# Patient Record
Sex: MALE | Race: WHITE | Employment: STUDENT | URBAN - METROPOLITAN AREA
[De-identification: names, ages, dates, MRNs, and addresses within clinical notes are randomized per-mention and may not be internally consistent; named-entity substitution may affect disease eponyms.]

---

## 2019-01-26 ENCOUNTER — APPOINTMENT (OUTPATIENT)
Dept: GENERAL RADIOLOGY | Facility: CLINIC | Age: 19
End: 2019-01-26
Payer: COMMERCIAL

## 2019-01-26 ENCOUNTER — HOSPITAL ENCOUNTER (EMERGENCY)
Facility: CLINIC | Age: 19
Discharge: HOME | End: 2019-01-26
Attending: EMERGENCY MEDICINE | Admitting: EMERGENCY MEDICINE
Payer: COMMERCIAL

## 2019-01-26 VITALS
OXYGEN SATURATION: 98 % | TEMPERATURE: 98.1 F | WEIGHT: 180.2 LBS | HEIGHT: 71 IN | SYSTOLIC BLOOD PRESSURE: 119 MMHG | RESPIRATION RATE: 14 BRPM | HEART RATE: 65 BPM | DIASTOLIC BLOOD PRESSURE: 58 MMHG | BODY MASS INDEX: 25.23 KG/M2

## 2019-01-26 DIAGNOSIS — S93.402A SPRAIN OF LEFT ANKLE, UNSPECIFIED LIGAMENT, INITIAL ENCOUNTER: ICD-10-CM

## 2019-01-26 PROCEDURE — 73610 X-RAY EXAM OF ANKLE: CPT | Mod: LT

## 2019-01-26 PROCEDURE — 99284 EMERGENCY DEPT VISIT MOD MDM: CPT

## 2019-01-26 PROCEDURE — 99283 EMERGENCY DEPT VISIT LOW MDM: CPT | Mod: Z6 | Performed by: EMERGENCY MEDICINE

## 2019-01-26 RX ORDER — OMEGA-3 FATTY ACIDS/FISH OIL 300-1000MG
CAPSULE ORAL
COMMUNITY

## 2019-01-26 ASSESSMENT — ENCOUNTER SYMPTOMS
WEAKNESS: 0
WOUND: 1
NUMBNESS: 0
HEADACHES: 0

## 2019-01-26 ASSESSMENT — MIFFLIN-ST. JEOR: SCORE: 1844.51

## 2019-01-26 NOTE — ED AVS SNAPSHOT
[facility], [place], Emergency Department  [address] [place] [id number]  Phone:  [phone]                                    Ion Matta   MRN: [id number]    Department:  [facility], Emergency Department   Date of Visit:  1/26/2019           After Visit Summary Signature Page    I have received my discharge instructions, and my questions have been answered. I have discussed any challenges I see with this plan with the nurse or doctor.    ..........................................................................................................................................  Patient/Patient Representative Signature      ..........................................................................................................................................  Patient Representative Print Name and Relationship to Patient    ..................................................               ................................................  Date                                   Time    ..........................................................................................................................................  Reviewed by Signature/Title    ...................................................              ..............................................  Date                                               Time          22EPIC Rev 08/18

## 2019-01-26 NOTE — ED PROVIDER NOTES
[place] EMERGENCY DEPARTMENT ([facility])  January 26, 2019    History     Chief Complaint   Patient presents with     Ankle Pain     HPI  Ion Matta is a 19 year old male who is otherwise healthy presents the ED after left ankle injury.  Patient states that yesterday he rolled his left ankle and was unable to bear weight on it afterwards.  He states he has been using his friend's crutches since then.  He states most of the pain is along the lateral aspect of his ankle and also has a minor contusion along the medial aspect.  He reports he took some Tylenol at noon today.  He otherwise currently denies any numbness or tingling, weakness, loss of consciousness, or head injury.    PAST MEDICAL HISTORY  History reviewed. No pertinent past medical history.  PAST SURGICAL HISTORY  History reviewed. No pertinent surgical history.  FAMILY HISTORY  History reviewed. No pertinent family history.  SOCIAL HISTORY  Social History     Tobacco Use     Smoking status: Never Smoker     Smokeless tobacco: Never Used   Substance Use Topics     Alcohol use: Yes     Comment: 10 drinks per week     MEDICATIONS  No current facility-administered medications for this encounter.      Current Outpatient Medications   Medication     acetaminophen-codeine (TYLENOL #3) 300-30 MG tablet     Multiple Vitamins-Minerals (MULTIVITAMIN ADULT PO)     omega 3 1000 MG CAPS     UNABLE TO FIND     ALLERGIES  Allergies   Allergen Reactions     No Clinical Screening - See Comments      Developed hives and itching with an inhaler. Unknown to patient which medication       I have reviewed the Medications, Allergies, Past Medical and Surgical History, and Social History in the Epic system.    Review of Systems   Musculoskeletal: Positive for gait problem.        Positive for L ankle pain.    Skin: Positive for wound (minor contusion along medial aspect of L ankle).   Neurological: Negative for syncope, weakness, numbness and headaches.  "  All other systems reviewed and are negative.      Physical Exam   BP: 138/54  Pulse: 60  Temp: 98.1  F (36.7  C)  Resp: 14  Height: 180.3 cm (5' 11\")  Weight: 81.7 kg (180 lb 3.2 oz)  SpO2: 98 %      Physical Exam   Constitutional: He is oriented to person, place, and time. He appears well-developed and well-nourished. No distress.   HENT:   Head: Normocephalic and atraumatic.   Mouth/Throat: Oropharynx is clear and moist.   Eyes: Conjunctivae are normal. No scleral icterus.   Neck: Normal range of motion. Neck supple.   Cardiovascular: Normal rate.   Pulmonary/Chest: Effort normal. No respiratory distress.   Abdominal: He exhibits no distension.   Musculoskeletal: He exhibits no deformity.        Left knee: Normal. He exhibits normal range of motion, no swelling and no ecchymosis. No tenderness found.        Left ankle: He exhibits decreased range of motion, swelling and ecchymosis. He exhibits no deformity, no laceration and normal pulse. Tenderness. Lateral malleolus and CF ligament tenderness found. No medial malleolus, no AITFL, no posterior TFL, no head of 5th metatarsal and no proximal fibula tenderness found. Achilles tendon normal.        Left foot: Normal.        Feet:    Soft tissue swelling, tenderness, and ecchymoses over medial and lateral malleoli. Normal strength to plantar and dorsiflexion. Normal distal perfusion and sensation. No foot tenderness.   Neurological: He is alert and oriented to person, place, and time.   Skin: Skin is warm and dry. No rash noted. He is not diaphoretic.   Psychiatric: He has a normal mood and affect. His behavior is normal. Thought content normal.   Nursing note and vitals reviewed.      ED Course        Procedures   2:35 PM  The patient was seen and examined by Dr. Santos in Room 19.                Critical Care time:  none             Labs Ordered and Resulted from Time of ED Arrival Up to the Time of Departure from the ED - No data to display         Assessments & " Plan (with Medical Decision Making)   Ion Matta is a 19 year old male who is otherwise healthy presents the ED after left ankle injury.      Ddx: sprain, fx, contusion    X-ray negative for acute traumatic abnormality.  Patient's exam significant for normal strength to dorsiflexion and plantarflexion.  No sensory deficits.  Normal distal perfusion.  Mildly tender over the lateral malleolus.  Patient has crutches with him that he has been using at home.  Placed in an air splint cast and advised on conservative management.  Follow-up with PCP if symptoms not resolve within a week.  Weight-bear as tolerated.    I have reviewed the nursing notes.    I have reviewed the findings, diagnosis, plan and need for follow up with the patient.       Medication List      There are no discharge medications for this visit.         Final diagnoses:   Sprain of left ankle, unspecified ligament, initial encounter     IDeangelo, am serving as a trained medical scribe to document services personally performed by Kareen Santos MD, based on the provider's statements to me.      Kareen TORRES MD, was physically present and have reviewed and verified the accuracy of this note documented by Deangelo Atkinson.    1/26/2019   [facility], [place], EMERGENCY DEPARTMENT     Kareen Santos MD  01/26/19 [id number]

## 2019-01-26 NOTE — ED TRIAGE NOTES
Pt landed on left ankle while playing basketball yesterday. Ankle swollen and unable to bear weight. Patient took 2 tabs tylenol with codeine at 1200. Reports no significant past medical history.

## 2019-04-08 ENCOUNTER — HOSPITAL ENCOUNTER (EMERGENCY)
Facility: CLINIC | Age: 19
Discharge: HOME | End: 2019-04-08
Attending: EMERGENCY MEDICINE | Admitting: EMERGENCY MEDICINE
Payer: COMMERCIAL

## 2019-04-08 VITALS
WEIGHT: 170 LBS | HEIGHT: 71 IN | RESPIRATION RATE: 16 BRPM | TEMPERATURE: 98.2 F | HEART RATE: 58 BPM | DIASTOLIC BLOOD PRESSURE: 45 MMHG | BODY MASS INDEX: 23.8 KG/M2 | OXYGEN SATURATION: 96 % | SYSTOLIC BLOOD PRESSURE: 113 MMHG

## 2019-04-08 DIAGNOSIS — M25.572 LEFT ANKLE PAIN, UNSPECIFIED CHRONICITY: ICD-10-CM

## 2019-04-08 PROCEDURE — 99282 EMERGENCY DEPT VISIT SF MDM: CPT | Mod: Z6 | Performed by: EMERGENCY MEDICINE

## 2019-04-08 PROCEDURE — 99281 EMR DPT VST MAYX REQ PHY/QHP: CPT

## 2019-04-08 ASSESSMENT — MIFFLIN-ST. JEOR: SCORE: 1798.24

## 2019-04-08 ASSESSMENT — ENCOUNTER SYMPTOMS: ARTHRALGIAS: 1

## 2019-04-08 NOTE — ED TRIAGE NOTES
Patient presents today for evaluation of continued left ankle pain after injury while playing basketball approximately 10 weeks ago.  Seen here post injury and xrays one.

## 2019-04-08 NOTE — ED AVS SNAPSHOT
[facility], [place], Emergency Department  [address] [place] [address]  Phone:  [phone]                                    Ion Matta   MRN: [id number]    Department:  [facility], Emergency Department   Date of Visit:  4/8/2019           After Visit Summary Signature Page    I have received my discharge instructions, and my questions have been answered. I have discussed any challenges I see with this plan with the nurse or doctor.    ..........................................................................................................................................  Patient/Patient Representative Signature      ..........................................................................................................................................  Patient Representative Print Name and Relationship to Patient    ..................................................               ................................................  Date                                   Time    ..........................................................................................................................................  Reviewed by Signature/Title    ...................................................              ..............................................  Date                                               Time          22EPIC Rev 08/18

## 2019-04-08 NOTE — DISCHARGE INSTRUCTIONS
You should follow up with Orthopedics, they may want to get advanced imaging such as an MRI  Please make an appointment to follow up with Orthopedics (phone: [phone]) as soon as possible.

## 2019-04-08 NOTE — ED PROVIDER NOTES
[place] EMERGENCY DEPARTMENT ([facility])  4/08/19   History     Chief Complaint   Patient presents with     Trauma     The history is provided by the patient and medical records.     Ion Matta is a 19 year old male who is here about 10 weeks ago after playing basketball and he sustained an inversion type injury to his left ankle, he was seen here and had an x-ray done that was negative he was placed in a gel splint and did all the appropriate conservative management comes in now saying that he can weight-bear and ambulate without difficulty but if he tries to run or jump he has persistent pain both medially and laterally.    This part of the medical record was transcribed by Angelo John Medical Scribe, from a dictation done by Adebayo Rodriguez MD.      Exam: XR ANKLE LT G/E 3 VW, 1/26/2019 2:27 PM  Indication: pain, rolled  Comparison: None                                                 Impression:   No acute osseous abnormality. Soft tissue swelling.    I have reviewed the Medications, Allergies, Past Medical and Surgical History, and Social History in the Epic system.    No past medical history on file.    No past surgical history on file.    No family history on file.    Social History     Tobacco Use     Smoking status: Never Smoker     Smokeless tobacco: Never Used   Substance Use Topics     Alcohol use: Yes     Comment: 10 drinks per week       No current facility-administered medications for this encounter.      Current Outpatient Medications   Medication     acetaminophen-codeine (TYLENOL #3) 300-30 MG tablet     Multiple Vitamins-Minerals (MULTIVITAMIN ADULT PO)     omega 3 1000 MG CAPS     UNABLE TO FIND        Allergies   Allergen Reactions     No Clinical Screening - See Comments      Developed hives and itching with an inhaler. Unknown to patient which medication        Review of Systems   Musculoskeletal: Positive for arthralgias (left ankle pain).   All other systems  "reviewed and are negative.      Physical Exam   BP: 113/45  Pulse: 58  Temp: 98.2  F (36.8  C)  Resp: 14  Height: 180.3 cm (5' 11\")  Weight: 77.1 kg (170 lb)  SpO2: 96 %      Physical Exam   Constitutional: He is oriented to person, place, and time. He appears well-developed and well-nourished. No distress.   Pulmonary/Chest: No respiratory distress.   Musculoskeletal:        Left ankle: Normal. He exhibits normal range of motion and no swelling. No tenderness.   Neurological: He is alert and oriented to person, place, and time.   Nursing note and vitals reviewed.      ED Course        Procedures          Reviewed previous X-ray       Labs Ordered and Resulted from Time of ED Arrival Up to the Time of Departure from the ED - No data to display         Assessments & Plan (with Medical Decision Making)   This a 19-year-old male with ongoing left ankle pain after inversion type injury.  No indication for reimaging, there is no swelling that appreciate. His ankle seems stable. I recommend he follow-up with sports medicine and they can decide whether MRI is appropriate.    This part of the medical record was transcribed by Angelo John, Medical Scribe, from a dictation done by Adebayo Rodriguez MD.      I have reviewed the nursing notes.    I have reviewed the findings, diagnosis, plan and need for follow up with the patient.       Medication List      There are no discharge medications for this visit.         Final diagnoses:   Left ankle pain, unspecified chronicity       4/8/2019   [facility], [place], EMERGENCY DEPARTMENT     Adebayo Rodriguez MD  04/08/19 1428    "

## 2019-04-08 NOTE — TELEPHONE ENCOUNTER
RECORDS RECEIVED FROM: ED F/U Left Ankle Sprain, discuss getting MRI, per Pt, notes in chart   DATE RECEIVED: 4.9.19   NOTES STATUS DETAILS   OFFICE NOTE from referring provider N/A    OFFICE NOTE from other specialist N/A    DISCHARGE SUMMARY from hospital N/A    DISCHARGE REPORT from the ER Internal 4.8.19 [facility]  1.26.19 [facility]   OPERATIVE REPORT N/A    MEDICATION LIST Internal    IMPLANT RECORD/STICKER N/A    LABS     CBC/DIFF Care Everywhere 12.21.18   CULTURES N/A    INJECTIONS DONE IN RADIOLOGY N/A    MRI N/A    CT SCAN N/A    XRAYS (IMAGES & REPORTS) Internal 1.26.19   TUMOR     PATHOLOGY  Slides & report N/A

## 2019-04-09 ENCOUNTER — OFFICE VISIT (OUTPATIENT)
Dept: ORTHOPEDICS | Facility: CLINIC | Age: 19
End: 2019-04-09
Payer: COMMERCIAL

## 2019-04-09 ENCOUNTER — PRE VISIT (OUTPATIENT)
Dept: ORTHOPEDICS | Facility: CLINIC | Age: 19
End: 2019-04-09

## 2019-04-09 VITALS — BODY MASS INDEX: 23.8 KG/M2 | WEIGHT: 170 LBS | HEIGHT: 71 IN

## 2019-04-09 DIAGNOSIS — M76.72 PERONEAL TENDINITIS OF LEFT LOWER EXTREMITY: Primary | ICD-10-CM

## 2019-04-09 DIAGNOSIS — Z87.828 H/O SPRAIN OF ANKLE: ICD-10-CM

## 2019-04-09 ASSESSMENT — MIFFLIN-ST. JEOR: SCORE: 1798.24

## 2019-04-09 NOTE — LETTER
"  4/9/2019      RE: Ion Matta  [address]  [facility] [id number]       Sports Medicine Clinic Visit    PCP: No Ref-Primary, Physician    Ion Matta is a 19 year old male who is seen  in consultation at the request of the ED presenting with left ankle pain.     Injury: DOI: 1/25/19, rolled his ankle while playing basketball    Location of Pain: left ankle  Duration of Pain: 3 month(s)  Pain is better with: RICE  Pain is worse with: Running, jumping  Additional Features:   Treatment so far consists of: Nothing  Prior History of related problems:     Ht 1.803 m (5' 11\")   Wt 77.1 kg (170 lb)   BMI 23.71 kg/m          PMH:  Bilateral dry eyes  MGD (meibomian gland dysfunction)  Androgenetic alopecia            Active problem list:  There is no problem list on file for this patient.      FH:  Neg for inherited bone or jt dsr    SH:  Social History     Socioeconomic History     Marital status: Single     Spouse name: Not on file     Number of children: Not on file     Years of education: Not on file     Highest education level: Not on file   Occupational History     Not on file   Social Needs     Financial resource strain: Not on file     Food insecurity:     Worry: Not on file     Inability: Not on file     Transportation needs:     Medical: Not on file     Non-medical: Not on file   Tobacco Use     Smoking status: Never Smoker     Smokeless tobacco: Never Used   Substance and Sexual Activity     Alcohol use: Yes     Comment: 10 drinks per week     Drug use: No     Sexual activity: Yes     Partners: Female     Birth control/protection: Condom   Lifestyle     Physical activity:     Days per week: Not on file     Minutes per session: Not on file     Stress: Not on file   Relationships     Social connections:     Talks on phone: Not on file     Gets together: Not on file     Attends Sabianist service: Not on file     Active member of club or organization: Not on file     Attends meetings of clubs or " organizations: Not on file     Relationship status: Not on file     Intimate partner violence:     Fear of current or ex partner: Not on file     Emotionally abused: Not on file     Physically abused: Not on file     Forced sexual activity: Not on file   Other Topics Concern     Not on file   Social History Narrative     Not on file       MEDS:  See EMR, reviewed  ALL:  See EMR, reviewed    REVIEW OF SYSTEMS:  CONSTITUTIONAL:NEGATIVE for fever, chills, change in weight  INTEGUMENTARY/SKIN: NEGATIVE for worrisome rashes, moles or lesions  EYES: NEGATIVE for vision changes or irritation  ENT/MOUTH: NEGATIVE for ear, mouth and throat problems  RESP:NEGATIVE for significant cough or SOB  BREAST: NEGATIVE for masses, tenderness or discharge  CV: NEGATIVE for chest pain, palpitations or peripheral edema  GI: NEGATIVE for nausea, abdominal pain, heartburn, or change in bowel habits  :NEGATIVE for frequency, dysuria, or hematuria  :NEGATIVE for frequency, dysuria, or hematuria  NEURO: NEGATIVE for weakness, dizziness or paresthesias  ENDOCRINE: NEGATIVE for temperature intolerance, skin/hair changes  HEME/ALLERGY/IMMUNE: NEGATIVE for bleeding problems  PSYCHIATRIC: NEGATIVE for changes in mood or affect      Exam: XR ANKLE LT G/E 3 VW, 1/26/2019 2:27 PM     Indication: pain, rolled     Comparison: None     Findings:   AP, oblique and lateral views of the left ankle. No acute osseous  abnormality. Ankle mortise and syndesmosis are congruent on these  nonweightbearing images. Soft tissue swelling..                                                                      Impression:   No acute osseous abnormality. Soft tissue swelling.     I have personally reviewed the examination and initial interpretation  and I agree with the findings.     CUCO RICKS MD      Subjective: 3 months ago he rolled his left ankle playing basketball.  X-ray was negative at the time.  He denies a history of recurrent ankle sprains.  Since  then he has been able to return to sports but the ankle will bother him with running and jumping but not bother him with walking around with daily activities.  It does not lock or catch.  No recurrent swelling.  He points to the lateral aspect of the left ankle as the area that can be recurrently uncomfortable.    Objective: The ankle appears normal.  Today he is nontender directly over the peroneal tendon the Achilles tendon of the posterior tibial tendon.  He is nontender over the anterior joint at the talus and anterior and posterior impingement signs are nontender over the course of the syndesmosis.  Nontender over the navicular or the proximal fifth metatarsal at the foot.  Anterior drawer is negative and symmetrical to the nonaffected ankle.  Talar tilt is negative.  There is no effusion of the joint.  Overlying skin is intact.  Sensation is normal.  Range of motion to dorsiflexion volar flexion inversion and eversion is some nonaffected ankle.    Assessment: Recurrent ankle discomfort status post ankle sprain 3 months ago, suspect peroneal tendon dysfunction    Plan: We will start with a retraining program for the ankle as outlined by physical therapy.  He was given a lace up ankle brace.  If not making improvements over the next 6 weeks an MRI can be done to evaluate the chondral surface to make sure that there is no underlying chondral injury.  We discussed this MRI option and he agrees to follow-up with me if not improving with physical therapy.    This note was created with the use of Dragon software and unintentional spelling or errors may have occurred.            Joel Zimmerman MD

## 2019-04-09 NOTE — LETTER
Date:April 11, 2019      Patient was self referred, no letter generated. Do not send.        [facility] Health Information

## 2019-04-09 NOTE — PROGRESS NOTES
"Sports Medicine Clinic Visit    PCP: No Ref-Primary, Physician    Ion Matta is a 19 year old male who is seen  in consultation at the request of the ED presenting with left ankle pain.     Injury: DOI: 1/25/19, rolled his ankle while playing basketball    Location of Pain: left ankle  Duration of Pain: 3 month(s)  Pain is better with: RICE  Pain is worse with: Running, jumping  Additional Features:   Treatment so far consists of: Nothing  Prior History of related problems:     Ht 1.803 m (5' 11\")   Wt 77.1 kg (170 lb)   BMI 23.71 kg/m         PMH:  Bilateral dry eyes  MGD (meibomian gland dysfunction)  Androgenetic alopecia            Active problem list:  There is no problem list on file for this patient.      FH:  Neg for inherited bone or jt dsr    SH:  Social History     Socioeconomic History     Marital status: Single     Spouse name: Not on file     Number of children: Not on file     Years of education: Not on file     Highest education level: Not on file   Occupational History     Not on file   Social Needs     Financial resource strain: Not on file     Food insecurity:     Worry: Not on file     Inability: Not on file     Transportation needs:     Medical: Not on file     Non-medical: Not on file   Tobacco Use     Smoking status: Never Smoker     Smokeless tobacco: Never Used   Substance and Sexual Activity     Alcohol use: Yes     Comment: 10 drinks per week     Drug use: No     Sexual activity: Yes     Partners: Female     Birth control/protection: Condom   Lifestyle     Physical activity:     Days per week: Not on file     Minutes per session: Not on file     Stress: Not on file   Relationships     Social connections:     Talks on phone: Not on file     Gets together: Not on file     Attends Zoroastrianism service: Not on file     Active member of club or organization: Not on file     Attends meetings of clubs or organizations: Not on file     Relationship status: Not on file     Intimate partner " violence:     Fear of current or ex partner: Not on file     Emotionally abused: Not on file     Physically abused: Not on file     Forced sexual activity: Not on file   Other Topics Concern     Not on file   Social History Narrative     Not on file       MEDS:  See EMR, reviewed  ALL:  See EMR, reviewed    REVIEW OF SYSTEMS:  CONSTITUTIONAL:NEGATIVE for fever, chills, change in weight  INTEGUMENTARY/SKIN: NEGATIVE for worrisome rashes, moles or lesions  EYES: NEGATIVE for vision changes or irritation  ENT/MOUTH: NEGATIVE for ear, mouth and throat problems  RESP:NEGATIVE for significant cough or SOB  BREAST: NEGATIVE for masses, tenderness or discharge  CV: NEGATIVE for chest pain, palpitations or peripheral edema  GI: NEGATIVE for nausea, abdominal pain, heartburn, or change in bowel habits  :NEGATIVE for frequency, dysuria, or hematuria  :NEGATIVE for frequency, dysuria, or hematuria  NEURO: NEGATIVE for weakness, dizziness or paresthesias  ENDOCRINE: NEGATIVE for temperature intolerance, skin/hair changes  HEME/ALLERGY/IMMUNE: NEGATIVE for bleeding problems  PSYCHIATRIC: NEGATIVE for changes in mood or affect      Exam: XR ANKLE LT G/E 3 VW, 1/26/2019 2:27 PM     Indication: pain, rolled     Comparison: None     Findings:   AP, oblique and lateral views of the left ankle. No acute osseous  abnormality. Ankle mortise and syndesmosis are congruent on these  nonweightbearing images. Soft tissue swelling..                                                                      Impression:   No acute osseous abnormality. Soft tissue swelling.     I have personally reviewed the examination and initial interpretation  and I agree with the findings.     CUCO RICKS MD      Subjective: 3 months ago he rolled his left ankle playing basketball.  X-ray was negative at the time.  He denies a history of recurrent ankle sprains.  Since then he has been able to return to sports but the ankle will bother him with running  and jumping but not bother him with walking around with daily activities.  It does not lock or catch.  No recurrent swelling.  He points to the lateral aspect of the left ankle as the area that can be recurrently uncomfortable.    Objective: The ankle appears normal.  Today he is nontender directly over the peroneal tendon the Achilles tendon of the posterior tibial tendon.  He is nontender over the anterior joint at the talus and anterior and posterior impingement signs are nontender over the course of the syndesmosis.  Nontender over the navicular or the proximal fifth metatarsal at the foot.  Anterior drawer is negative and symmetrical to the nonaffected ankle.  Talar tilt is negative.  There is no effusion of the joint.  Overlying skin is intact.  Sensation is normal.  Range of motion to dorsiflexion volar flexion inversion and eversion is some nonaffected ankle.    Assessment: Recurrent ankle discomfort status post ankle sprain 3 months ago, suspect peroneal tendon dysfunction    Plan: We will start with a retraining program for the ankle as outlined by physical therapy.  He was given a lace up ankle brace.  If not making improvements over the next 6 weeks an MRI can be done to evaluate the chondral surface to make sure that there is no underlying chondral injury.  We discussed this MRI option and he agrees to follow-up with me if not improving with physical therapy.    This note was created with the use of Dragon software and unintentional spelling or errors may have occurred.

## 2019-04-19 ENCOUNTER — THERAPY VISIT (OUTPATIENT)
Dept: PHYSICAL THERAPY | Facility: CLINIC | Age: 19
End: 2019-04-19
Payer: COMMERCIAL

## 2019-04-19 DIAGNOSIS — S93.492D SPRAIN OF ANTERIOR TALOFIBULAR LIGAMENT OF LEFT ANKLE, SUBSEQUENT ENCOUNTER: ICD-10-CM

## 2019-04-19 PROBLEM — S93.492A SPRAIN OF ANTERIOR TALOFIBULAR LIGAMENT OF LEFT ANKLE: Status: ACTIVE | Noted: 2019-04-19

## 2019-04-19 PROCEDURE — 97140 MANUAL THERAPY 1/> REGIONS: CPT | Mod: GP | Performed by: PHYSICAL THERAPIST

## 2019-04-19 PROCEDURE — 97161 PT EVAL LOW COMPLEX 20 MIN: CPT | Mod: GP | Performed by: PHYSICAL THERAPIST

## 2019-04-19 NOTE — PROGRESS NOTES
Physical Therapy Initial Examination/Evaluation  April 19, 2019    Ion Matta is a 19 year old male referred to physical therapy by Joel Zimmerman MD for treatment of L ankle sprain with Precautions/Restrictions/MD instructions E&T    Therapist Impression:   Will presents with a loss of DF ROM following the above injury.  We will address this with ankle mobilizations while focusing on ankle stabilization exercises and general LE strength, progressing to power exercises.    Subjective:  DOI/onset: 1/26/2019 DOS: none  Acute Injury or Gradual Onset?: Acute injury onset  Mechanism of Injury: Sport; playing basketball  Related PMH: hx of ankle sprains Previous Treatment: Rest, Ice compression, and Elevation, brace Effect of prior treatment: good  Imaging: x-ray  Chief Complaint/Functional Limitations:   Running and playing basketball, lost explosive power and see below in therapy evaluation codes   Pain: rest 0 /10, activity 1/10 Location: medial and anterior Frequency: Intermittent Described as: sharp Alleviated by: Rest Progression of Symptoms: Gradually getting better. Time of day when pain is worse: Activity related  Sleeping: No issues/uninterrupted   Occupation: Student  Job duties: prolonged sitting, keyboarding/computer use  Current HEP/exercise regimen: weight training 4 x week, basketball  Patient's goals are see chief complaints    Other pertinent PMH/Red Flags: None   Barriers at home/work: None as reported by patient  Pertinent Surgical History: None  Medications: None as reported by patient  General health as reported by patient: excellent  Return to MD:  Prn    ANKLE EVALUATION    Static Posture  No obvious findings    Dynamic Movement Screen  Single leg stance observations: Diffculty with balance eyes closed L  Double limb squat observations: Good technique/no significant findings  Single limb squat observations: Knee valgus and Hip internal rotation R, LOB on L    Ankle Range of Motion  Ankle  AROM Dorsiflexion Plantarflexion Inversion Eversion WBing DF (cm)   Left wnl wnl wnl wnl 10.5 / 11.5 post PT   Right wnl wnl wnl wnl 17   **WBing DF- distance between wall and great toe where pt can touch knee to wall and keep heel in contact with floor    Special Tests:  Ligament testing: negative anterior drawer and talar tilt  Normal forefoot mobility      Assessment/Plan:  Patient is a 19 year old male with left side ankle complaints.    Patient has the following significant findings with corresponding treatment plan.                Diagnosis 1:  L ankle sprain  Pain -  hot/cold therapy, manual therapy, splint/taping/bracing/orthotics, self management, education and home program  Decreased ROM/flexibility - manual therapy and therapeutic exercise  Decreased joint mobility - manual therapy and therapeutic exercise  Impaired balance - neuro re-education and therapeutic activities  Impaired muscle performance - neuro re-education    Therapy Evaluation Codes:   1) History comprised of:   Personal factors that impact the plan of care:      None.    Comorbidity factors that impact the plan of care are:      None.     Medications impacting care: None.  2) Examination of Body Systems comprised of:   Body structures and functions that impact the plan of care:      Ankle.   Activity limitations that impact the plan of care are:      Sports.  3) Clinical presentation characteristics are:   Stable/Uncomplicated.  4) Decision-Making    Low complexity using standardized patient assessment instrument and/or measureable assessment of functional outcome.  Cumulative Therapy Evaluation is: Low complexity.    Previous and current functional limitations:  (See Goal Flow Sheet for this information)    Short term and Long term goals: (See Goal Flow Sheet for this information)     Communication ability:  Patient appears to be able to clearly communicate and understand verbal and written communication and follow directions  correctly.  Treatment Explanation - The following has been discussed with the patient:   RX ordered/plan of care  Anticipated outcomes  Possible risks and side effects  This patient would benefit from PT intervention to resume normal activities.   Rehab potential is good.    Frequency:  1 X week, once daily  Duration:  for 6 weeks  Discharge Plan:  Achieve all LTG.  Independent in home treatment program.  Reach maximal therapeutic benefit.    Please refer to the daily flowsheet for treatment today, total treatment time and time spent performing 1:1 timed codes.

## 2019-04-29 ENCOUNTER — THERAPY VISIT (OUTPATIENT)
Dept: PHYSICAL THERAPY | Facility: CLINIC | Age: 19
End: 2019-04-29
Payer: COMMERCIAL

## 2019-04-29 DIAGNOSIS — S93.492D SPRAIN OF ANTERIOR TALOFIBULAR LIGAMENT OF LEFT ANKLE, SUBSEQUENT ENCOUNTER: ICD-10-CM

## 2019-04-29 PROCEDURE — 97140 MANUAL THERAPY 1/> REGIONS: CPT | Mod: GP | Performed by: PHYSICAL THERAPIST

## 2019-04-29 PROCEDURE — 97530 THERAPEUTIC ACTIVITIES: CPT | Mod: GP | Performed by: PHYSICAL THERAPIST

## 2019-04-29 PROCEDURE — 97112 NEUROMUSCULAR REEDUCATION: CPT | Mod: GP | Performed by: PHYSICAL THERAPIST

## 2019-04-29 NOTE — PROGRESS NOTES
Ankle Range of Motion  Ankle AROM Dorsiflexion Plantarflexion Inversion Eversion WBing DF (cm)   Left wnl wnl wnl wnl 11.5 / 12.5 post PT   Right wnl wnl wnl wnl 17     Restricted anterior fibula translation

## 2019-05-17 ENCOUNTER — THERAPY VISIT (OUTPATIENT)
Dept: PHYSICAL THERAPY | Facility: CLINIC | Age: 19
End: 2019-05-17
Payer: COMMERCIAL

## 2019-05-17 DIAGNOSIS — S93.492D SPRAIN OF ANTERIOR TALOFIBULAR LIGAMENT OF LEFT ANKLE, SUBSEQUENT ENCOUNTER: ICD-10-CM

## 2019-05-17 PROCEDURE — 97140 MANUAL THERAPY 1/> REGIONS: CPT | Mod: GP | Performed by: PHYSICAL THERAPIST

## 2019-05-17 PROCEDURE — 97110 THERAPEUTIC EXERCISES: CPT | Mod: GP | Performed by: PHYSICAL THERAPIST

## 2019-05-17 NOTE — PROGRESS NOTES
Therapist Impression:   Lack of progress in DF ROM.  Continues to have pain. Recommend 1. Follow up with Dr. Zimmerman for further examination. 2. Follow up with Sanchez Gamboa PT or Gordon Rahman PT for 2nd opinion, 3. Continue LE stability exercises    Ankle Range of Motion  Ankle AROM Dorsiflexion Plantarflexion Inversion Eversion WBing DF (cm)   Left wnl wnl wnl wnl 10.5 / 12 post MT   Right wnl wnl wnl wnl 17     Restricted anterior fibula translation

## 2019-06-12 ENCOUNTER — TELEPHONE (OUTPATIENT)
Dept: ORTHOPEDICS | Facility: CLINIC | Age: 19
End: 2019-06-12

## 2019-06-12 DIAGNOSIS — S93.492D SPRAIN OF ANTERIOR TALOFIBULAR LIGAMENT OF LEFT ANKLE, SUBSEQUENT ENCOUNTER: Primary | ICD-10-CM

## 2019-06-12 NOTE — TELEPHONE ENCOUNTER
Informed patient that the MRI was placed and gave him the number to call and schedule. Also, informed patient to make a follow up with  to go over results.

## 2019-06-12 NOTE — TELEPHONE ENCOUNTER
[facility] Call [facility]    Phone Message    May a detailed message be left on voicemail: yes    Reason for Call: Other: pt would like to have an MRI on his L ankle, please place order and call pt when ready     Action Taken: Message routed to:  [facility] Ortho

## 2019-06-13 ENCOUNTER — ANCILLARY PROCEDURE (OUTPATIENT)
Dept: MRI IMAGING | Facility: CLINIC | Age: 19
End: 2019-06-13
Attending: FAMILY MEDICINE
Payer: COMMERCIAL

## 2019-06-13 DIAGNOSIS — S93.492D SPRAIN OF ANTERIOR TALOFIBULAR LIGAMENT OF LEFT ANKLE, SUBSEQUENT ENCOUNTER: ICD-10-CM

## 2019-06-24 ENCOUNTER — TELEPHONE (OUTPATIENT)
Dept: ORTHOPEDICS | Facility: CLINIC | Age: 19
End: 2019-06-24

## 2019-06-24 NOTE — TELEPHONE ENCOUNTER
[facility] Call [facility]    Phone Message    May a detailed message be left on voicemail: yes    Reason for Call: Other: Pt had an MRI on his foot o 6/13/19 and hasn't received the results. Please call him as soon as possible to discuss     Action Taken: Message routed to:  [facility] Ortho

## 2019-06-24 NOTE — TELEPHONE ENCOUNTER
I informed patient that Dr. Zimmerman would like him to come in to review MRI results. Appointment was scheduled for Wednesday 6/26//19 at 6:15 pm.

## 2019-06-26 ENCOUNTER — OFFICE VISIT (OUTPATIENT)
Dept: ORTHOPEDICS | Facility: CLINIC | Age: 19
End: 2019-06-26
Payer: COMMERCIAL

## 2019-06-26 DIAGNOSIS — Z87.828 H/O SPRAIN OF ANKLE: Primary | ICD-10-CM

## 2019-06-26 DIAGNOSIS — M76.72 PERONEAL TENDINITIS OF LEFT LOWER EXTREMITY: ICD-10-CM

## 2019-06-26 NOTE — LETTER
6/26/2019      RE: Ion Matta  [address]  [facility] [address]       June 26, 2019: Ion Matta is a 19 year old male who is seen in f/u up with left ankle pain to review MRI results. 3 months ago he rolled his left ankle playing basketball.  X-ray was negative at the time.  He denies a history of recurrent ankle sprains.  Since then he has been able to return to sports but the ankle will bother him with running and jumping but not bother him with walking around with daily activities.  It does not lock or catch.  No recurrent swelling.  He points to the lateral aspect of the left ankle as the area that can be recurrently uncomfortable.      MR left ankle without  contrast 6/13/2019 3:35 PM     History: evaluate the chondral surface to make sure that there is no  underlying chondral injury; Sprain of anterior talofibular ligament of  left ankle, subsequent encounter     Additional history from [facility]ima: Date of injury January 2019.     Techniques: Multiplanar multisequence imaging of the left ankle was  obtained without administration of intra-articular or intravenous  contrast.     Comparison: Radiographs January 26, 2019     Findings:     MEDIAL COMPARTMENT     Tendons: Thickening of posterior tibialis tendon just proximal to the  navicular attachment, likely a fibrocartilaginous nodule. Relatively  disproportional amount of fluid surrounding the posterior tibialis  tendon especially distally, query tenosynovitis. Otherwise, medial  flexor tendons are intact.     Ligaments: Intermediate signal of the deep portion of deltoid with  associated marrow edema at the medial malleolus, consistent with  sprain with partial tearing. Superficial component of the deltoid is  thickened, likely related to remote trauma. Spring ligamentous  complexes are intact.     LATERAL COMPARTMENT     Tendons: Peroneus brevis tendinosis. Signal attenuation of the  peroneus longus between the peroneal tubercle just  proximal to the  cuboid tunnel, may be related to magic angle artifact along the lining  tendon pathology cannot excluded. Trace fluid in the peroneal tendon  sheath.     Ligaments: Marked thickening with diffuse intermediate signal of the  anterior talofibular ligament, consistent with high-grade sprain,  likely subchronic injury. Calcaneofibular ligament and posterior  talofibular ligaments are intact. Syndesmotic ligamentous complex is  intact.     POSTERIOR COMPARTMENT     Achilles tendon: Intact.     Plantar fascia: Normal.      ANTERIOR COMPARTMENT     Tendons: Anterior extensor tendons are intact.     JOINTS     Small ankle joint effusion.     GENERAL FINDINGS     Bones: No fracture, marrow contusion or infiltrative change. No talar  dome osteochondral lesion.     Muscles: Normal.     Tarsal tunnel: Normal.      Sinus tarsi: Normal.      ANCILLARY FINDINGS     Lisfranc intraosseous ligament is intact.                                                                      Impression:  1. No osteochondral injury.  2. Sprain deep deltoid with partial tearing and associated marrow  edema in medial malleolus.  3. High grade sprain anterior talofibular ligament, likely subchronic.  4. Relatively disproportional amount of fluid surrounding the  posterior tibialis tendon especially distally, query tenosynovitis     ANTONETTE HOLLINS            PMH:  Active problem list:  Patient Active Problem List   Diagnosis     Sprain of anterior talofibular ligament of left ankle   Bilateral dry eyes  MGD (meibomian gland dysfunction)  Androgenetic alopecia    FH:  Neg for inherited bone or jt dsr    SH:  Social History     Socioeconomic History     Marital status: Single     Spouse name: Not on file     Number of children: Not on file     Years of education: Not on file     Highest education level: Not on file   Occupational History     Not on file   Social Needs     Financial resource strain: Not on file     Food insecurity:      Worry: Not on file     Inability: Not on file     Transportation needs:     Medical: Not on file     Non-medical: Not on file   Tobacco Use     Smoking status: Never Smoker     Smokeless tobacco: Never Used   Substance and Sexual Activity     Alcohol use: Yes     Comment: 10 drinks per week     Drug use: No     Sexual activity: Yes     Partners: Female     Birth control/protection: Condom   Lifestyle     Physical activity:     Days per week: Not on file     Minutes per session: Not on file     Stress: Not on file   Relationships     Social connections:     Talks on phone: Not on file     Gets together: Not on file     Attends Amish service: Not on file     Active member of club or organization: Not on file     Attends meetings of clubs or organizations: Not on file     Relationship status: Not on file     Intimate partner violence:     Fear of current or ex partner: Not on file     Emotionally abused: Not on file     Physically abused: Not on file     Forced sexual activity: Not on file   Other Topics Concern     Not on file   Social History Narrative     Not on file       MEDS:  See EMR, reviewed  ALL:  See EMR, reviewed    REVIEW OF SYSTEMS:  CONSTITUTIONAL:NEGATIVE for fever, chills, change in weight  INTEGUMENTARY/SKIN: NEGATIVE for worrisome rashes, moles or lesions  EYES: NEGATIVE for vision changes or irritation  ENT/MOUTH: NEGATIVE for ear, mouth and throat problems  RESP:NEGATIVE for significant cough or SOB  BREAST: NEGATIVE for masses, tenderness or discharge  CV: NEGATIVE for chest pain, palpitations or peripheral edema  GI: NEGATIVE for nausea, abdominal pain, heartburn, or change in bowel habits  :NEGATIVE for frequency, dysuria, or hematuria  :NEGATIVE for frequency, dysuria, or hematuria  NEURO: NEGATIVE for weakness, dizziness or paresthesias  ENDOCRINE: NEGATIVE for temperature intolerance, skin/hair changes  HEME/ALLERGY/IMMUNE: NEGATIVE for bleeding problems  PSYCHIATRIC: NEGATIVE for  changes in mood or affect        Objective: Today there is no swelling at the left ankle.  He is nontender over the peroneal tendon, Achilles tendon, posterior tibial tendon.  Impingement signs are negative.  Nontender at the proximal fifth metatarsal or navicular.  Anterior drawer is negative and talar tilt is negative.    Assessment ankle sprain and peroneal tendinitis    Plan: We went over his MRI in detail.  There is no osteochondral lesion.  There is some signs of residual ankle sprain and peroneal tendinitis but without operative findings.  He has a lace up ankle brace.  He is seen physical therapy.  He has the exercises of proprioception and progressive plyometrics from his physical therapist.  We talked about his weight room protocol.  Twice a week he has been doing extensive calf raises.  I asked him to avoid these for now.  The rest of his weight room protocol seems appropriate.  To look for improvements over the next 8 weeks.                  Joel Zimmerman MD

## 2019-06-26 NOTE — LETTER
Date:June 27, 2019      Patient was self referred, no letter generated. Do not send.        [facility] Health Information

## 2019-06-26 NOTE — PROGRESS NOTES
June 26, 2019: Ion Matta is a 19 year old male who is seen in f/u up with left ankle pain to review MRI results. 3 months ago he rolled his left ankle playing basketball.  X-ray was negative at the time.  He denies a history of recurrent ankle sprains.  Since then he has been able to return to sports but the ankle will bother him with running and jumping but not bother him with walking around with daily activities.  It does not lock or catch.  No recurrent swelling.  He points to the lateral aspect of the left ankle as the area that can be recurrently uncomfortable.      MR left ankle without  contrast 6/13/2019 3:35 PM     History: evaluate the chondral surface to make sure that there is no  underlying chondral injury; Sprain of anterior talofibular ligament of  left ankle, subsequent encounter     Additional history from [facility]: Date of injury January 2019.     Techniques: Multiplanar multisequence imaging of the left ankle was  obtained without administration of intra-articular or intravenous  contrast.     Comparison: Radiographs January 26, 2019     Findings:     MEDIAL COMPARTMENT     Tendons: Thickening of posterior tibialis tendon just proximal to the  navicular attachment, likely a fibrocartilaginous nodule. Relatively  disproportional amount of fluid surrounding the posterior tibialis  tendon especially distally, query tenosynovitis. Otherwise, medial  flexor tendons are intact.     Ligaments: Intermediate signal of the deep portion of deltoid with  associated marrow edema at the medial malleolus, consistent with  sprain with partial tearing. Superficial component of the deltoid is  thickened, likely related to remote trauma. Spring ligamentous  complexes are intact.     LATERAL COMPARTMENT     Tendons: Peroneus brevis tendinosis. Signal attenuation of the  peroneus longus between the peroneal tubercle just proximal to the  cuboid tunnel, may be related to magic angle artifact along the  lining  tendon pathology cannot excluded. Trace fluid in the peroneal tendon  sheath.     Ligaments: Marked thickening with diffuse intermediate signal of the  anterior talofibular ligament, consistent with high-grade sprain,  likely subchronic injury. Calcaneofibular ligament and posterior  talofibular ligaments are intact. Syndesmotic ligamentous complex is  intact.     POSTERIOR COMPARTMENT     Achilles tendon: Intact.     Plantar fascia: Normal.      ANTERIOR COMPARTMENT     Tendons: Anterior extensor tendons are intact.     JOINTS     Small ankle joint effusion.     GENERAL FINDINGS     Bones: No fracture, marrow contusion or infiltrative change. No talar  dome osteochondral lesion.     Muscles: Normal.     Tarsal tunnel: Normal.      Sinus tarsi: Normal.      ANCILLARY FINDINGS     Lisfranc intraosseous ligament is intact.                                                                      Impression:  1. No osteochondral injury.  2. Sprain deep deltoid with partial tearing and associated marrow  edema in medial malleolus.  3. High grade sprain anterior talofibular ligament, likely subchronic.  4. Relatively disproportional amount of fluid surrounding the  posterior tibialis tendon especially distally, query tenosynovitis     ANTONETTE HOLLINS            PMH:  Active problem list:  Patient Active Problem List   Diagnosis     Sprain of anterior talofibular ligament of left ankle   Bilateral dry eyes  MGD (meibomian gland dysfunction)  Androgenetic alopecia    FH:  Neg for inherited bone or jt dsr    SH:  Social History     Socioeconomic History     Marital status: Single     Spouse name: Not on file     Number of children: Not on file     Years of education: Not on file     Highest education level: Not on file   Occupational History     Not on file   Social Needs     Financial resource strain: Not on file     Food insecurity:     Worry: Not on file     Inability: Not on file     Transportation needs:      Medical: Not on file     Non-medical: Not on file   Tobacco Use     Smoking status: Never Smoker     Smokeless tobacco: Never Used   Substance and Sexual Activity     Alcohol use: Yes     Comment: 10 drinks per week     Drug use: No     Sexual activity: Yes     Partners: Female     Birth control/protection: Condom   Lifestyle     Physical activity:     Days per week: Not on file     Minutes per session: Not on file     Stress: Not on file   Relationships     Social connections:     Talks on phone: Not on file     Gets together: Not on file     Attends [employer] service: Not on file     Active member of club or organization: Not on file     Attends meetings of clubs or organizations: Not on file     Relationship status: Not on file     Intimate partner violence:     Fear of current or ex partner: Not on file     Emotionally abused: Not on file     Physically abused: Not on file     Forced sexual activity: Not on file   Other Topics Concern     Not on file   Social History Narrative     Not on file       MEDS:  See EMR, reviewed  ALL:  See EMR, reviewed    REVIEW OF SYSTEMS:  CONSTITUTIONAL:NEGATIVE for fever, chills, change in weight  INTEGUMENTARY/SKIN: NEGATIVE for worrisome rashes, moles or lesions  EYES: NEGATIVE for vision changes or irritation  ENT/MOUTH: NEGATIVE for ear, mouth and throat problems  RESP:NEGATIVE for significant cough or SOB  BREAST: NEGATIVE for masses, tenderness or discharge  CV: NEGATIVE for chest pain, palpitations or peripheral edema  GI: NEGATIVE for nausea, abdominal pain, heartburn, or change in bowel habits  :NEGATIVE for frequency, dysuria, or hematuria  :NEGATIVE for frequency, dysuria, or hematuria  NEURO: NEGATIVE for weakness, dizziness or paresthesias  ENDOCRINE: NEGATIVE for temperature intolerance, skin/hair changes  HEME/ALLERGY/IMMUNE: NEGATIVE for bleeding problems  PSYCHIATRIC: NEGATIVE for changes in mood or affect        Objective: Today there is no swelling at the  left ankle.  He is nontender over the peroneal tendon, Achilles tendon, posterior tibial tendon.  Impingement signs are negative.  Nontender at the proximal fifth metatarsal or navicular.  Anterior drawer is negative and talar tilt is negative.    Assessment ankle sprain and peroneal tendinitis    Plan: We went over his MRI in detail.  There is no osteochondral lesion.  There is some signs of residual ankle sprain and peroneal tendinitis but without operative findings.  He has a lace up ankle brace.  He is seen physical therapy.  He has the exercises of proprioception and progressive plyometrics from his physical therapist.  We talked about his weight room protocol.  Twice a week he has been doing extensive calf raises.  I asked him to avoid these for now.  The rest of his weight room protocol seems appropriate.  To look for improvements over the next 8 weeks.

## 2019-12-09 PROBLEM — S93.492A SPRAIN OF ANTERIOR TALOFIBULAR LIGAMENT OF LEFT ANKLE: Status: RESOLVED | Noted: 2019-04-19 | Resolved: 2019-12-09
